# Patient Record
Sex: FEMALE | Race: WHITE | URBAN - METROPOLITAN AREA
[De-identification: names, ages, dates, MRNs, and addresses within clinical notes are randomized per-mention and may not be internally consistent; named-entity substitution may affect disease eponyms.]

---

## 2017-02-03 ENCOUNTER — TELEPHONE (OUTPATIENT)
Dept: INTERNAL MEDICINE CLINIC | Facility: CLINIC | Age: 42
End: 2017-02-03

## 2017-02-03 NOTE — TELEPHONE ENCOUNTER
Physician she was seeing in [place]  asking if she can come back to see you as a patient -- she is not new just been a while. Asking please.

## 2017-03-06 ENCOUNTER — OFFICE VISIT (OUTPATIENT)
Dept: INTERNAL MEDICINE CLINIC | Facility: CLINIC | Age: 42
End: 2017-03-06

## 2017-03-06 VITALS
HEIGHT: 63.5 IN | HEART RATE: 104 BPM | SYSTOLIC BLOOD PRESSURE: 124 MMHG | DIASTOLIC BLOOD PRESSURE: 76 MMHG | BODY MASS INDEX: 28.07 KG/M2 | OXYGEN SATURATION: 95 % | WEIGHT: 160.38 LBS | TEMPERATURE: 100 F

## 2017-03-06 DIAGNOSIS — F41.9 ANXIETY: ICD-10-CM

## 2017-03-06 DIAGNOSIS — G50.0 TRIGEMINAL NEURALGIA: Primary | ICD-10-CM

## 2017-03-06 PROCEDURE — 99212 OFFICE O/P EST SF 10 MIN: CPT | Performed by: INTERNAL MEDICINE

## 2017-03-06 PROCEDURE — 99203 OFFICE O/P NEW LOW 30 MIN: CPT | Performed by: INTERNAL MEDICINE

## 2017-03-06 RX ORDER — HYDROCODONE BITARTRATE AND ACETAMINOPHEN 5; 325 MG/1; MG/1
TABLET ORAL
Refills: 0 | COMMUNITY
Start: 2017-02-04 | End: 2017-03-06

## 2017-03-06 RX ORDER — DICYCLOMINE HYDROCHLORIDE 10 MG/1
CAPSULE ORAL
Refills: 6 | COMMUNITY
Start: 2016-12-14

## 2017-03-06 RX ORDER — GABAPENTIN 300 MG/1
300 CAPSULE ORAL 3 TIMES DAILY
Qty: 90 CAPSULE | Refills: 6 | Status: SHIPPED | OUTPATIENT
Start: 2017-03-06 | End: 2018-05-16

## 2017-03-06 RX ORDER — GABAPENTIN 100 MG/1
CAPSULE ORAL
COMMUNITY
End: 2017-03-06

## 2017-03-06 RX ORDER — LORAZEPAM 0.5 MG/1
TABLET ORAL
COMMUNITY
End: 2017-03-06

## 2017-03-06 RX ORDER — SUMATRIPTAN 100 MG/1
TABLET, FILM COATED ORAL
Refills: 2 | COMMUNITY
Start: 2017-02-27 | End: 2017-03-06

## 2017-03-06 RX ORDER — CHLORAL HYDRATE 500 MG
CAPSULE ORAL
COMMUNITY

## 2017-03-06 RX ORDER — LORAZEPAM 1 MG/1
TABLET ORAL
Refills: 5 | COMMUNITY
Start: 2017-02-17 | End: 2017-03-06

## 2017-03-06 RX ORDER — CITALOPRAM 20 MG/1
20 TABLET ORAL DAILY
Qty: 30 TABLET | Refills: 6 | Status: SHIPPED | OUTPATIENT
Start: 2017-03-06 | End: 2017-08-02

## 2017-03-06 RX ORDER — HYDROCODONE BITARTRATE AND ACETAMINOPHEN 5; 325 MG/1; MG/1
1 TABLET ORAL EVERY 8 HOURS PRN
Qty: 90 TABLET | Refills: 0 | Status: SHIPPED | OUTPATIENT
Start: 2017-03-06 | End: 2017-05-15

## 2017-03-06 RX ORDER — SUMATRIPTAN 100 MG/1
TABLET, FILM COATED ORAL
Qty: 9 TABLET | Refills: 2 | Status: SHIPPED | OUTPATIENT
Start: 2017-03-06 | End: 2017-08-02

## 2017-03-06 RX ORDER — LORAZEPAM 1 MG/1
1 TABLET ORAL 3 TIMES DAILY PRN
Qty: 90 TABLET | Refills: 3 | Status: SHIPPED | OUTPATIENT
Start: 2017-03-06 | End: 2017-08-02

## 2017-03-06 NOTE — PROGRESS NOTES
HPI:    Patient ID: Batool Patton is a 39year old female. HPI  Trigeminal neuralgia  Patient with miserable jaw pain for many years. Has been in [place] and unable to come here. She is off her celexa and on a far smaller dose of the gabapentin.  Jaqui Eastman appears well-developed and well-nourished. HENT:   Head: Normocephalic and atraumatic.    Right Ear: Tympanic membrane and ear canal normal.   Left Ear: Tympanic membrane and ear canal normal.   No erythema   Eyes: EOM are normal. Pupils are equal, round,

## 2017-03-09 PROBLEM — F41.0 PANIC ATTACK: Status: ACTIVE | Noted: 2017-03-09

## 2017-03-09 PROBLEM — F43.10 POSTTRAUMATIC STRESS DISORDER: Status: ACTIVE | Noted: 2017-03-09

## 2017-03-09 PROBLEM — K58.9 IRRITABLE BOWEL SYNDROME: Status: ACTIVE | Noted: 2017-03-09

## 2017-05-15 RX ORDER — HYDROCODONE BITARTRATE AND ACETAMINOPHEN 5; 325 MG/1; MG/1
1 TABLET ORAL EVERY 8 HOURS PRN
Qty: 90 TABLET | Refills: 0 | Status: SHIPPED | OUTPATIENT
Start: 2017-05-15 | End: 2017-08-02

## 2017-05-15 NOTE — TELEPHONE ENCOUNTER
Patient asking for refill states shes having bad jaw pain     Current outpatient prescriptions:   •  HYDROcodone-acetaminophen 5-325 MG Oral Tab, Take 1 tablet by mouth every 8 (eight) hours as needed for Pain., Disp: 90 tablet, Rfl: 0

## 2017-08-02 ENCOUNTER — TELEPHONE (OUTPATIENT)
Dept: INTERNAL MEDICINE CLINIC | Facility: CLINIC | Age: 42
End: 2017-08-02

## 2017-08-02 RX ORDER — LORAZEPAM 1 MG/1
1 TABLET ORAL 3 TIMES DAILY PRN
Qty: 90 TABLET | Refills: 0 | OUTPATIENT
Start: 2017-08-02 | End: 2017-08-04

## 2017-08-02 RX ORDER — HYDROCODONE BITARTRATE AND ACETAMINOPHEN 5; 325 MG/1; MG/1
1 TABLET ORAL EVERY 8 HOURS PRN
Qty: 90 TABLET | Refills: 0 | OUTPATIENT
Start: 2017-08-02 | End: 2017-08-04

## 2017-08-02 RX ORDER — SUMATRIPTAN 100 MG/1
TABLET, FILM COATED ORAL
Qty: 9 TABLET | Refills: 0 | Status: SHIPPED | OUTPATIENT
Start: 2017-08-02 | End: 2017-09-11

## 2017-08-02 RX ORDER — CITALOPRAM 20 MG/1
20 TABLET ORAL DAILY
Qty: 30 TABLET | Refills: 0 | Status: SHIPPED | OUTPATIENT
Start: 2017-08-02 | End: 2017-11-10

## 2017-08-02 NOTE — TELEPHONE ENCOUNTER
Needs refill please call when ready     Current Outpatient Prescriptions:   •  HYDROcodone-acetaminophen 5-325 MG Oral Tab, Take 1 tablet by mouth every 8 (eight) hours as needed for Pain., Disp: 90 tablet, Rfl: 0    • •  Citalopram Hydrobromide 20 MG Or

## 2017-08-04 RX ORDER — LORAZEPAM 1 MG/1
1 TABLET ORAL 3 TIMES DAILY PRN
Qty: 90 TABLET | Refills: 0 | Status: SHIPPED | OUTPATIENT
Start: 2017-08-04 | End: 2017-09-11

## 2017-08-04 RX ORDER — HYDROCODONE BITARTRATE AND ACETAMINOPHEN 5; 325 MG/1; MG/1
1 TABLET ORAL EVERY 8 HOURS PRN
Qty: 90 TABLET | Refills: 0 | Status: SHIPPED | OUTPATIENT
Start: 2017-08-04 | End: 2017-09-11

## 2017-09-11 NOTE — PATIENT INSTRUCTIONS
ASSESSMENT/PLAN:   Diagnoses and all orders for this visit:    Anxiety  Patient overall doing well on current meds.  Discussed relaxation techniques  Trigeminal neuralgia  To continue meds  Migraine without aura and responsive to treatment  Continue imi

## 2017-09-11 NOTE — PROGRESS NOTES
HPI:    Patient ID: Jorge Lombardi is a 43year old female. HPI  Anxiety   Patient overall doing well. She is using her ativan prn only. Usually taking 1 daily. Taking the citalopram daily. Saw counselor in past, but didn't feel it would help.      Tori Ramos Discussed relaxation techniques  Trigeminal neuralgia  To continue meds  Migraine without aura and responsive to treatment  Continue imitrex as needed, seems to be working well          IZ#[id number]

## 2017-09-15 ENCOUNTER — TELEPHONE (OUTPATIENT)
Dept: INTERNAL MEDICINE CLINIC | Facility: CLINIC | Age: 42
End: 2017-09-15

## 2017-09-15 NOTE — TELEPHONE ENCOUNTER
Informed pt Dr. Kary Watson recommendation for OTC eye drops. Instructions given. If symptoms has not resolved or worsen call the office or make a appt to be seen. verbalized understanding.

## 2017-09-15 NOTE — TELEPHONE ENCOUNTER
Yes, this is contagious until it resolves. Most is viral, she can get OTC eye drops like naphcon or vasocon which will help the symptoms, usually takes 5-7 days to fully resolve.  Wash hands every time she touches her eyes

## 2017-09-15 NOTE — TELEPHONE ENCOUNTER
Patient with pink eye on both eye  The right being the worse very itching ,burning with discharge would like to get medication if possible she was just seen in the office on Monday . Is this contagious ?  For how long

## 2017-11-10 RX ORDER — LORAZEPAM 1 MG/1
1 TABLET ORAL 3 TIMES DAILY PRN
Qty: 90 TABLET | Refills: 3 | Status: SHIPPED | OUTPATIENT
Start: 2017-11-10 | End: 2018-05-16

## 2017-11-10 RX ORDER — CITALOPRAM 20 MG/1
20 TABLET ORAL DAILY
Qty: 30 TABLET | Refills: 2 | Status: SHIPPED | OUTPATIENT
Start: 2017-11-10 | End: 2018-04-09

## 2017-11-10 NOTE — TELEPHONE ENCOUNTER
Current Outpatient Prescriptions:     •  LORazepam 1 MG Oral Tab, Take 1 tablet (1 mg total) by mouth 3 (three) times daily as needed for Anxiety. , Disp: 90 tablet, Rfl: 0    •  Citalopram Hydrobromide 20 MG Oral Tab, Take 1 tablet (20 mg total) by mouth

## 2017-11-10 NOTE — TELEPHONE ENCOUNTER
Rx request for Lorazepam 1 mg, UNABLE to fill per protocol. Please review. Thank you.     Refill Protocol Appointment Criteria  · Appointment scheduled in the past 6 months or in the next 3 months  Recent Outpatient Visits            2 months ago Anxiety

## 2017-11-13 ENCOUNTER — TELEPHONE (OUTPATIENT)
Dept: INTERNAL MEDICINE CLINIC | Facility: CLINIC | Age: 42
End: 2017-11-13

## 2017-11-13 RX ORDER — HYDROCODONE BITARTRATE AND ACETAMINOPHEN 5; 325 MG/1; MG/1
1 TABLET ORAL EVERY 8 HOURS PRN
Qty: 90 TABLET | Refills: 0 | Status: SHIPPED | OUTPATIENT
Start: 2017-11-13 | End: 2018-02-02

## 2017-11-13 RX ORDER — CYCLOBENZAPRINE HCL 10 MG
TABLET ORAL
Refills: 3 | COMMUNITY
Start: 2017-09-11

## 2017-11-13 NOTE — TELEPHONE ENCOUNTER
Current Outpatient Prescriptions:     •  HYDROcodone-acetaminophen 5-325 MG Oral Tab, Take 1 tablet by mouth every 8 (eight) hours as needed for Pain., Disp: 90 tablet, Rfl: 0 REFILL     PLEASE CALL PATIENT WHEN READY

## 2018-02-02 RX ORDER — HYDROCODONE BITARTRATE AND ACETAMINOPHEN 5; 325 MG/1; MG/1
1 TABLET ORAL EVERY 8 HOURS PRN
Qty: 90 TABLET | Refills: 0 | Status: SHIPPED | OUTPATIENT
Start: 2018-02-02 | End: 2018-04-09

## 2018-02-02 RX ORDER — HYDROCODONE BITARTRATE AND ACETAMINOPHEN 5; 325 MG/1; MG/1
1 TABLET ORAL EVERY 8 HOURS PRN
Qty: 90 TABLET | Refills: 0
Start: 2018-02-02 | End: 2018-02-02

## 2018-02-02 RX ORDER — SUMATRIPTAN 100 MG/1
TABLET, FILM COATED ORAL
Qty: 9 TABLET | Refills: 3 | Status: SHIPPED
Start: 2018-02-02 | End: 2018-04-09

## 2018-02-02 NOTE — TELEPHONE ENCOUNTER
From: Aixa Thurman  Sent: 2/2/2018 8:33 AM CST  Subject: Medication Renewal Request    Sondra LOERA  Rochelle Cleo would like a refill of the following medications:     SUMAtriptan Succinate 100 MG Oral Tab Niels Busby MD]     HYDROcodone-acetaminophe

## 2018-04-07 NOTE — PROGRESS NOTES
HPI:    Patient ID: Alma Smith is a 43year old female. HPI  Anxiety  Patient very stressed. Her  is very depressed, won't get off the couch since his mom  suddenly of ovarian cancer.    Patient doing very well with her anxiety, taking continue. Panic attack  stable  Trigeminal neuralgia  Using less of the norco and overall doing well, encouraged her to continue the neurontin and taper off the norco as she can.          [id number]

## 2018-04-09 ENCOUNTER — OFFICE VISIT (OUTPATIENT)
Dept: INTERNAL MEDICINE CLINIC | Facility: CLINIC | Age: 43
End: 2018-04-09

## 2018-04-09 VITALS
SYSTOLIC BLOOD PRESSURE: 116 MMHG | BODY MASS INDEX: 26.22 KG/M2 | HEIGHT: 63 IN | DIASTOLIC BLOOD PRESSURE: 73 MMHG | HEART RATE: 101 BPM | WEIGHT: 148 LBS

## 2018-04-09 DIAGNOSIS — F41.0 PANIC ATTACK: ICD-10-CM

## 2018-04-09 DIAGNOSIS — G50.0 TRIGEMINAL NEURALGIA: ICD-10-CM

## 2018-04-09 DIAGNOSIS — G43.009 MIGRAINE WITHOUT AURA AND RESPONSIVE TO TREATMENT: Primary | ICD-10-CM

## 2018-04-09 PROCEDURE — 99213 OFFICE O/P EST LOW 20 MIN: CPT | Performed by: INTERNAL MEDICINE

## 2018-04-09 PROCEDURE — 99212 OFFICE O/P EST SF 10 MIN: CPT | Performed by: INTERNAL MEDICINE

## 2018-04-09 RX ORDER — HYDROCODONE BITARTRATE AND ACETAMINOPHEN 5; 325 MG/1; MG/1
1 TABLET ORAL EVERY 8 HOURS PRN
Qty: 90 TABLET | Refills: 0 | Status: SHIPPED | OUTPATIENT
Start: 2018-04-09 | End: 2018-05-16

## 2018-04-09 RX ORDER — SUMATRIPTAN 100 MG/1
TABLET, FILM COATED ORAL
Qty: 9 TABLET | Refills: 3 | Status: SHIPPED | OUTPATIENT
Start: 2018-04-09 | End: 2018-08-10

## 2018-04-09 NOTE — TELEPHONE ENCOUNTER
C•  SUMAtriptan Succinate 100 MG Oral Tab, TK 1 T PO AT ONSET OF SYMPTOMS PRN FOR MIGRAINE REPEAT IN 2 HOURS IF NEEDED, Disp: 9 tablet, Rfl: 3  •

## 2018-04-09 NOTE — PATIENT INSTRUCTIONS
ASSESSMENT/PLAN:   Diagnoses and all orders for this visit:    Migraine without aura and responsive to treatment  Patient stable with her meds, will continue.    Panic attack  stable  Trigeminal neuralgia  Using less of the norco and overall doing well,

## 2018-05-15 ENCOUNTER — PATIENT MESSAGE (OUTPATIENT)
Dept: INTERNAL MEDICINE CLINIC | Facility: CLINIC | Age: 43
End: 2018-05-15

## 2018-05-16 RX ORDER — LORAZEPAM 1 MG/1
1 TABLET ORAL 3 TIMES DAILY PRN
Qty: 90 TABLET | Refills: 3 | Status: SHIPPED | OUTPATIENT
Start: 2018-05-16

## 2018-05-16 RX ORDER — HYDROCODONE BITARTRATE AND ACETAMINOPHEN 5; 325 MG/1; MG/1
1 TABLET ORAL EVERY 8 HOURS PRN
Qty: 90 TABLET | Refills: 0 | Status: SHIPPED | OUTPATIENT
Start: 2018-05-16 | End: 2018-07-09

## 2018-05-16 RX ORDER — GABAPENTIN 300 MG/1
300 CAPSULE ORAL 3 TIMES DAILY
Qty: 90 CAPSULE | Refills: 6 | Status: SHIPPED | OUTPATIENT
Start: 2018-05-16

## 2018-07-07 NOTE — TELEPHONE ENCOUNTER
Current Outpatient Prescriptions:   • •  HYDROcodone-acetaminophen 5-325 MG Oral Tab, Take 1 tablet by mouth every 8 (eight) hours as needed for Pain., Disp: 90 tablet, Rfl: 0  •

## 2018-07-09 RX ORDER — HYDROCODONE BITARTRATE AND ACETAMINOPHEN 5; 325 MG/1; MG/1
1 TABLET ORAL EVERY 8 HOURS PRN
Qty: 90 TABLET | Refills: 0 | Status: SHIPPED | OUTPATIENT
Start: 2018-07-09 | End: 2018-08-09

## 2018-08-10 RX ORDER — HYDROCODONE BITARTRATE AND ACETAMINOPHEN 5; 325 MG/1; MG/1
1 TABLET ORAL EVERY 8 HOURS PRN
Qty: 90 TABLET | Refills: 0 | Status: SHIPPED | OUTPATIENT
Start: 2018-08-10

## 2018-08-10 RX ORDER — SUMATRIPTAN 100 MG/1
TABLET, FILM COATED ORAL
Qty: 9 TABLET | Refills: 1 | Status: SHIPPED | OUTPATIENT
Start: 2018-08-10 | End: 2018-12-03

## 2018-09-10 NOTE — TELEPHONE ENCOUNTER
Patient request refill on   Lorazepam and sumatriptan for 3 refills  Needs refill on pain med Norco  Will  on Wednesday morning

## 2018-09-12 ENCOUNTER — TELEPHONE (OUTPATIENT)
Dept: INTERNAL MEDICINE CLINIC | Facility: CLINIC | Age: 43
End: 2018-09-12

## 2018-09-12 RX ORDER — LORAZEPAM 1 MG/1
1 TABLET ORAL 3 TIMES DAILY PRN
Qty: 90 TABLET | Refills: 3 | OUTPATIENT
Start: 2018-09-12

## 2018-09-12 RX ORDER — HYDROCODONE BITARTRATE AND ACETAMINOPHEN 5; 325 MG/1; MG/1
1 TABLET ORAL EVERY 8 HOURS PRN
Qty: 90 TABLET | Refills: 0 | OUTPATIENT
Start: 2018-09-12

## 2018-09-12 RX ORDER — SUMATRIPTAN 100 MG/1
TABLET, FILM COATED ORAL
Qty: 9 TABLET | Refills: 3 | OUTPATIENT
Start: 2018-09-12

## 2018-09-12 NOTE — TELEPHONE ENCOUNTER
Pt requesting for Rx to be called in to Pharmacy. Written Rx per Dr. Dania Mcgregor sent to scanning.    Rx called in to [facility], [facility]

## 2018-09-12 NOTE — TELEPHONE ENCOUNTER
Current Outpatient Medications:    Please call patient when ready to      HYDROcodone-acetaminophen 5-325 MG Oral Tab Take 1 tablet by mouth every 8 (eight) hours as needed for Pain.  Disp: 90 tablet Rfl: 0

## 2018-10-11 ENCOUNTER — TELEPHONE (OUTPATIENT)
Dept: INTERNAL MEDICINE CLINIC | Facility: CLINIC | Age: 43
End: 2018-10-11

## 2018-10-12 RX ORDER — HYDROCODONE BITARTRATE AND ACETAMINOPHEN 5; 325 MG/1; MG/1
1 TABLET ORAL EVERY 8 HOURS PRN
Qty: 30 TABLET | Refills: 0 | Status: SHIPPED | OUTPATIENT
Start: 2018-10-12 | End: 2018-12-03

## 2018-10-12 NOTE — TELEPHONE ENCOUNTER
Patient calling request refill on Norco having severe trigeminal neuralgia has been out of [address],   Is going through divorce and no insurance until next month.   Financially having problems,  was out of work for months  Asking for one refill on [address]

## 2018-10-12 NOTE — TELEPHONE ENCOUNTER
Left message on patient voice mail Rx written for #30 last note of Dr. Annia Valdez was that was going to try to wean off. Needs to make follow up appt in 1-2 months.   Norco Rx will be at  for Monday

## 2018-12-03 ENCOUNTER — TELEPHONE (OUTPATIENT)
Dept: INTERNAL MEDICINE CLINIC | Facility: CLINIC | Age: 43
End: 2018-12-03

## 2018-12-03 RX ORDER — HYDROCODONE BITARTRATE AND ACETAMINOPHEN 5; 325 MG/1; MG/1
1 TABLET ORAL EVERY 8 HOURS PRN
Qty: 30 TABLET | Refills: 0 | Status: SHIPPED | OUTPATIENT
Start: 2018-12-03 | End: 2019-02-05

## 2018-12-03 RX ORDER — SUMATRIPTAN 100 MG/1
TABLET, FILM COATED ORAL
Qty: 25 TABLET | Refills: 1 | Status: SHIPPED | OUTPATIENT
Start: 2018-12-03

## 2018-12-03 NOTE — TELEPHONE ENCOUNTER
Pt requesting to speak to [facility] only regarding   Refill on migraine and pain meds.   shes going to a divorce and she is not felling well

## 2018-12-04 NOTE — TELEPHONE ENCOUNTER
I reviewed patient's chart and reviewed [facility]  I have approved both medications.   Please let patient follow-up with us in the next few weeks

## 2018-12-04 NOTE — TELEPHONE ENCOUNTER
Patient going through bad divorce, had to give  another $200.00 today,  took her off the insurance. Has been having terrible migraines, and trigeminal neuralgia pain  Asking for imitrex and norco.  Will have to pay out of pocket for both.   Pl

## 2018-12-04 NOTE — TELEPHONE ENCOUNTER
Patient notified refills approved should follow up in few weeks. Patient agreed   Imitrex (generic) called to pharmacy  [facility] in morning.

## 2019-02-05 ENCOUNTER — TELEPHONE (OUTPATIENT)
Dept: INTERNAL MEDICINE CLINIC | Facility: CLINIC | Age: 44
End: 2019-02-05

## 2019-02-05 RX ORDER — HYDROCODONE BITARTRATE AND ACETAMINOPHEN 5; 325 MG/1; MG/1
1 TABLET ORAL EVERY 8 HOURS PRN
Qty: 30 TABLET | Refills: 0 | Status: SHIPPED | OUTPATIENT
Start: 2019-02-05

## 2019-02-05 NOTE — TELEPHONE ENCOUNTER
Needs refill on Norco   Is off of work on [address] 46 made appt to see you 2/11/19 9:30 am - asking if she can  refill on Norco even if just enough until she sees you  Monday.     Going through bad divorce, had to leave house with only clothes she was we

## 2019-06-12 RX ORDER — LORAZEPAM 1 MG/1
TABLET ORAL
Qty: 90 TABLET | Refills: 0 | OUTPATIENT
Start: 2019-06-12

## 2019-06-12 RX ORDER — SUMATRIPTAN 100 MG/1
TABLET, FILM COATED ORAL
Qty: 9 TABLET | Refills: 0 | OUTPATIENT
Start: 2019-06-12

## 2019-06-17 RX ORDER — SUMATRIPTAN 100 MG/1
TABLET, FILM COATED ORAL
Qty: 9 TABLET | Refills: 0 | OUTPATIENT
Start: 2019-06-17

## (undated) NOTE — MR AVS SNAPSHOT
[address] [facility] [address]  [phone]               Thank you for choosing us for your health care visit with Kamilah Eli MD.  We are glad to serve you and happy to provide you with this summary of your Take by mouth. SUMAtriptan Succinate 100 MG Tabs   TK 1 T PO AT ONSET OF SYMPTOMS PRN FOR MIGRAINE REPEAT IN 2 HOURS IF NEEDED   What changed:  See the new instructions.    Commonly known as:  IMITREX                Where to Get Your Medications Make half your plate fruits and vegetables Highly refined, white starches including white bread, rice and pasta   Eat plenty of protein, keep the fat content low Sugars:  sodas and sports drinks, candies and desserts   Eat plenty of low-fat dairy products